# Patient Record
Sex: MALE | Race: WHITE | NOT HISPANIC OR LATINO | Employment: PART TIME | URBAN - METROPOLITAN AREA
[De-identification: names, ages, dates, MRNs, and addresses within clinical notes are randomized per-mention and may not be internally consistent; named-entity substitution may affect disease eponyms.]

---

## 2021-09-15 ENCOUNTER — HOSPITAL ENCOUNTER (EMERGENCY)
Facility: CLINIC | Age: 71
Discharge: HOME | End: 2021-09-15
Attending: EMERGENCY MEDICINE | Admitting: EMERGENCY MEDICINE
Payer: MEDICARE

## 2021-09-15 VITALS
BODY MASS INDEX: 20.09 KG/M2 | TEMPERATURE: 98.2 F | HEIGHT: 66 IN | WEIGHT: 125 LBS | OXYGEN SATURATION: 100 % | SYSTOLIC BLOOD PRESSURE: 117 MMHG | RESPIRATION RATE: 13 BRPM | DIASTOLIC BLOOD PRESSURE: 91 MMHG | HEART RATE: 47 BPM

## 2021-09-15 DIAGNOSIS — R42 LIGHTHEADEDNESS: ICD-10-CM

## 2021-09-15 DIAGNOSIS — R11.0 NAUSEA: ICD-10-CM

## 2021-09-15 DIAGNOSIS — Z86.79 HISTORY OF CORONARY ARTERY DISEASE: ICD-10-CM

## 2021-09-15 LAB
ALBUMIN SERPL-MCNC: 3.6 G/DL (ref 3.4–5)
ALP SERPL-CCNC: 70 U/L (ref 40–150)
ALT SERPL W P-5'-P-CCNC: 29 U/L (ref 0–70)
ANION GAP SERPL CALCULATED.3IONS-SCNC: 6 MMOL/L (ref 3–14)
AST SERPL W P-5'-P-CCNC: 42 U/L (ref 0–45)
ATRIAL RATE - MUSE: 46 BPM
BASOPHILS # BLD AUTO: 0 10E3/UL (ref 0–0.2)
BASOPHILS NFR BLD AUTO: 0 %
BILIRUB DIRECT SERPL-MCNC: 0.3 MG/DL (ref 0–0.2)
BILIRUB SERPL-MCNC: 1.2 MG/DL (ref 0.2–1.3)
BUN SERPL-MCNC: 25 MG/DL (ref 7–30)
CALCIUM SERPL-MCNC: 9 MG/DL (ref 8.5–10.1)
CHLORIDE BLD-SCNC: 104 MMOL/L (ref 94–109)
CO2 SERPL-SCNC: 27 MMOL/L (ref 20–32)
CREAT SERPL-MCNC: 1.15 MG/DL (ref 0.66–1.25)
DIASTOLIC BLOOD PRESSURE - MUSE: NORMAL MMHG
EOSINOPHIL # BLD AUTO: 0 10E3/UL (ref 0–0.7)
EOSINOPHIL NFR BLD AUTO: 1 %
ERYTHROCYTE [DISTWIDTH] IN BLOOD BY AUTOMATED COUNT: 12.2 % (ref 10–15)
GFR SERPL CREATININE-BSD FRML MDRD: 64 ML/MIN/1.73M2
GLUCOSE BLD-MCNC: 133 MG/DL (ref 70–99)
HCT VFR BLD AUTO: 43.4 % (ref 40–53)
HGB BLD-MCNC: 15 G/DL (ref 13.3–17.7)
HOLD SPECIMEN: NORMAL
HOLD SPECIMEN: NORMAL
IMM GRANULOCYTES # BLD: 0 10E3/UL
IMM GRANULOCYTES NFR BLD: 0 %
INTERPRETATION ECG - MUSE: NORMAL
LIPASE SERPL-CCNC: 224 U/L (ref 73–393)
LYMPHOCYTES # BLD AUTO: 0.7 10E3/UL (ref 0.8–5.3)
LYMPHOCYTES NFR BLD AUTO: 12 %
MCH RBC QN AUTO: 32 PG (ref 26.5–33)
MCHC RBC AUTO-ENTMCNC: 34.6 G/DL (ref 31.5–36.5)
MCV RBC AUTO: 93 FL (ref 78–100)
MONOCYTES # BLD AUTO: 0.4 10E3/UL (ref 0–1.3)
MONOCYTES NFR BLD AUTO: 7 %
NEUTROPHILS # BLD AUTO: 4.6 10E3/UL (ref 1.6–8.3)
NEUTROPHILS NFR BLD AUTO: 80 %
NRBC # BLD AUTO: 0 10E3/UL
NRBC BLD AUTO-RTO: 0 /100
P AXIS - MUSE: 51 DEGREES
PLATELET # BLD AUTO: 88 10E3/UL (ref 150–450)
POTASSIUM BLD-SCNC: 4.4 MMOL/L (ref 3.4–5.3)
PR INTERVAL - MUSE: 168 MS
PROT SERPL-MCNC: 6.8 G/DL (ref 6.8–8.8)
QRS DURATION - MUSE: 104 MS
QT - MUSE: 480 MS
QTC - MUSE: 420 MS
R AXIS - MUSE: 18 DEGREES
RBC # BLD AUTO: 4.69 10E6/UL (ref 4.4–5.9)
SODIUM SERPL-SCNC: 137 MMOL/L (ref 133–144)
SYSTOLIC BLOOD PRESSURE - MUSE: NORMAL MMHG
T AXIS - MUSE: 40 DEGREES
TROPONIN I SERPL-MCNC: <0.015 UG/L (ref 0–0.04)
VENTRICULAR RATE- MUSE: 46 BPM
WBC # BLD AUTO: 5.7 10E3/UL (ref 4–11)

## 2021-09-15 PROCEDURE — 82040 ASSAY OF SERUM ALBUMIN: CPT | Performed by: EMERGENCY MEDICINE

## 2021-09-15 PROCEDURE — 84484 ASSAY OF TROPONIN QUANT: CPT | Performed by: EMERGENCY MEDICINE

## 2021-09-15 PROCEDURE — 85025 COMPLETE CBC W/AUTO DIFF WBC: CPT | Performed by: EMERGENCY MEDICINE

## 2021-09-15 PROCEDURE — 80048 BASIC METABOLIC PNL TOTAL CA: CPT | Performed by: EMERGENCY MEDICINE

## 2021-09-15 PROCEDURE — 250N000011 HC RX IP 250 OP 636: Performed by: EMERGENCY MEDICINE

## 2021-09-15 PROCEDURE — 82248 BILIRUBIN DIRECT: CPT | Performed by: EMERGENCY MEDICINE

## 2021-09-15 PROCEDURE — 93005 ELECTROCARDIOGRAM TRACING: CPT

## 2021-09-15 PROCEDURE — 36415 COLL VENOUS BLD VENIPUNCTURE: CPT | Performed by: EMERGENCY MEDICINE

## 2021-09-15 PROCEDURE — 99284 EMERGENCY DEPT VISIT MOD MDM: CPT | Mod: 25

## 2021-09-15 PROCEDURE — 83690 ASSAY OF LIPASE: CPT | Performed by: EMERGENCY MEDICINE

## 2021-09-15 PROCEDURE — 96374 THER/PROPH/DIAG INJ IV PUSH: CPT

## 2021-09-15 RX ORDER — ONDANSETRON 2 MG/ML
4 INJECTION INTRAMUSCULAR; INTRAVENOUS ONCE
Status: COMPLETED | OUTPATIENT
Start: 2021-09-15 | End: 2021-09-15

## 2021-09-15 RX ORDER — ONDANSETRON 4 MG/1
4 TABLET, ORALLY DISINTEGRATING ORAL EVERY 8 HOURS PRN
Qty: 10 TABLET | Refills: 0 | Status: SHIPPED | OUTPATIENT
Start: 2021-09-15

## 2021-09-15 RX ADMIN — ONDANSETRON 4 MG: 2 INJECTION INTRAMUSCULAR; INTRAVENOUS at 11:33

## 2021-09-15 ASSESSMENT — ENCOUNTER SYMPTOMS
DIAPHORESIS: 1
NAUSEA: 1
PALPITATIONS: 0
VOMITING: 0
LIGHT-HEADEDNESS: 1
ABDOMINAL PAIN: 0
SHORTNESS OF BREATH: 0

## 2021-09-15 ASSESSMENT — MIFFLIN-ST. JEOR: SCORE: 1264.75

## 2021-09-15 NOTE — ED PROVIDER NOTES
History   Chief Complaint:  Dizziness and Nausea    The history is provided by the patient.      Sergey Beltran is a 70 year old male with history of coronary artery disease s/p CABG 21 years ago who presents with dizziness and nausea. Sergey developed dizziness - described as lightheadedness - and diaphoresis last night about 20 hours prior to arrival when he awoke to use the restroom. He was able to go back to sleep and woke up 4 hours later with nausea without vomiting and lightheadedness. His diaphoresis had resolved. He tells me the only other time he had these symptoms was before his CABG and this prompted his concern. He denies any chest pain, shortness of breath, palpitations, or abdominal pain.     Sergey had a stress test one year ago that was unremarkable. He just moved from [place], [place] to [place] 5 days ago.    Review of Systems   Constitutional: Positive for diaphoresis (resolved).   Respiratory: Negative for shortness of breath.    Cardiovascular: Negative for chest pain and palpitations.   Gastrointestinal: Positive for nausea. Negative for abdominal pain and vomiting.   Neurological: Positive for light-headedness.   All other systems reviewed and are negative.    Allergies:  The patient has no known allergies.     Medications:  ASA  Atenolol  Ramipril    Past Medical History:    Myocardial infarction  CABG   Coronary artery disease  Colonic polyps  Hypertension  Irritable bowel syndrome    Surgical History:  CABG  Colonoscopy  Trigger finger relase  Esophagogastroduodenoscopy  Vasectomy    Family History:  Mother - Diabetes Mellitus, Heart Disease  Father - Coronary Artery Disease, Myocardial Infarction     Social History:  The patient arrived to the emergency department via EMS. Wife later at bedside.  Currently in the process of moving to the area from [place], [place].  [facility]  Has 4 grandchildren.  Denies tobacco use.     Physical Exam     Patient Vitals for  "the past 24 hrs:   BP Temp Temp src Pulse Resp SpO2 Height Weight   09/15/21 1130 -- -- -- (!) 47 13 100 % -- --   09/15/21 1119 -- -- -- 50 17 100 % -- --   09/15/21 1116 -- 98.2  F (36.8  C) Oral (!) 48 11 100 % -- --   09/15/21 1111 (!) 117/91 -- -- 51 18 100 % 1.676 m (5' 6\") 56.7 kg (125 lb)       Physical Exam  General: Well-developed and well-nourished. Well appearing elderly  man. Cooperative.  Head:  Atraumatic.  Eyes:  Conjunctivae, lids, and sclerae are normal.  Neck:  Supple. Normal range of motion.  CV:  Bradycardic rate and regular rhythm. Normal heart sounds with no murmurs, rubs, or gallops detected.  Resp:  No respiratory distress. Clear to auscultation bilaterally without decreased breath sounds, wheezing, rales, or rhonchi.  GI:  Soft. Non-distended. Non-tender.    MS:  Normal ROM. No bilateral lower extremity edema.  Skin:  Warm. Non-diaphoretic. No pallor.  Neuro:  Awake. A&Ox3. Normal strength.  Psych: Normal mood and affect. Normal speech.  Vitals reviewed.    Emergency Department Course   EKG  Indication: Dizziness, Nausea, Diaphoresis   Time: 1112  Rate 46 bpm. IN interval 168. QRS duration 104. QT/QTc 480/420.   Sinus bradycardia  Incomplete right bundle branch block  Anterior infarct, age undetermined  Abnormal ECG   No acute ST changes.  No prior for comparison.    Laboratory:   CBC: WBC 5.7, HGB 15.0, PLT 88 (L)   BMP: Glucose 133 (H) o/w WNL (Creatinine 1.15)   Hepatic Panel: Bilirubin Direct 0.3 (H), o/w WNL  Troponin (Collected 1120): <0.015  Lipase: 224    Emergency Department Course:    Reviewed:  I reviewed nursing notes and vitals.  I reviewed Care Everywhere when it was available.    Assessments:  1118 I obtained history and examined the patient as noted above.   1306 I rechecked the patient and explained findings. He has eaten and feels improved. He is comfortable with discharge.     Interventions:  1133 Zofran 4 mg IV    Disposition:  The patient was discharged home. " "    Impression & Plan   Medical Decision Making:  Sergey is a 70-year-old man with a history of coronary artery disease requiring CABG 21 years ago who developed nausea without vomiting, lightheadedness, and clammy diaphoresis 20 hours prior to arrival.  He was able to fall asleep after the symptoms but when he awoke about 16 hours prior to arrival he still felt nauseous and lightheaded.  He tells me the only other time he had these symptoms was \"before my heart attack\" which prompted his visit.  He denies all other concerns or complaints.  He appears well on exam.  His EKG is reassuring with sinus bradycardia.  There are no ischemic changes.  Initial troponin is undetectable.  Because this was obtained 20 hours after onset of symptoms, this essentially rules out ACS.  He is new to the area and does not have any cardiologist here.  I was able to obtain prior records which show reassuring stress test in October 2020.  His work-up here is otherwise unremarkable aside from thrombocytopenia to 88.  It appears his platelets were 119 in 2018 so I suspect this is chronic.  He was treated with Zofran and required no further interventions.  On repeat assessment he felt improved.  He has been able to tolerate PO and ambulate without difficulty.  At this point it is uncertain what caused his symptoms but given his reassuring work-up he is appropriate for discharge.  He is due for another stress test and I have placed an order for this.  I have also placed referrals for both primary care and cardiology given he is new to the area and will require ER follow-up to reassess his symptoms.  I will provide him Zofran as needed for nausea and vomiting at home and encouraged a bland diet.  He does understand he needs to return to the emergency department if he has recurrence/worsening of symptoms or if he develops any new concerns.  All questions answered.    Diagnosis:    ICD-10-CM    1. Nausea  R11.0    2. Lightheadedness  R42    3. " History of coronary artery disease  Z86.79      Scribe Disclosure:  I, Sophie Christy, am serving as a scribe at 11:17 AM on 9/15/2021 to document services personally performed by Brigida Tatum MD based on my observations and the provider's statements to me.     Brigida Tatum MD  09/17/21 1100

## 2021-09-15 NOTE — DISCHARGE INSTRUCTIONS
Zofran as needed for nausea or vomiting.  [place] diet today.  I placed an order for an outpatient stress test as well as a referral to both cardiology and primary care.  You should be getting calls to help arrange these.  You need to return immediately if you have worsening of symptoms or you develop any new concerns including, but not limited to, chest pain, shortness of breath, or palpitations.

## 2021-09-15 NOTE — ED TRIAGE NOTES
Pt BIBA, pt just moved to the area from [place], [place]. Patient reports last night he had some chills, sweats and this morning he developed nausea feeling lightheaded and weak. Heart rate for EMS 48, pt normally in the 50s. Hx of MI and CABG. Patient does not take blood thinner. Pt took 2 tabs of 325mg aspirin. Pt received 4mg zofran IV per EMS. Blood glucose for EMS was 137. Denies chest pain or SOB.

## 2024-10-26 ENCOUNTER — APPOINTMENT (OUTPATIENT)
Dept: GENERAL RADIOLOGY | Facility: CLINIC | Age: 74
End: 2024-10-26
Attending: EMERGENCY MEDICINE
Payer: MEDICARE

## 2024-10-26 ENCOUNTER — HOSPITAL ENCOUNTER (EMERGENCY)
Facility: CLINIC | Age: 74
Discharge: HOME | End: 2024-10-26
Attending: EMERGENCY MEDICINE | Admitting: EMERGENCY MEDICINE
Payer: MEDICARE

## 2024-10-26 VITALS
HEART RATE: 56 BPM | BODY MASS INDEX: 21.79 KG/M2 | WEIGHT: 135 LBS | RESPIRATION RATE: 16 BRPM | TEMPERATURE: 97.9 F | DIASTOLIC BLOOD PRESSURE: 47 MMHG | SYSTOLIC BLOOD PRESSURE: 168 MMHG | OXYGEN SATURATION: 98 %

## 2024-10-26 DIAGNOSIS — R10.13 EPIGASTRIC PAIN: ICD-10-CM

## 2024-10-26 LAB
ALBUMIN SERPL BCG-MCNC: 4.1 G/DL (ref 3.5–5.2)
ALP SERPL-CCNC: 78 U/L (ref 40–150)
ALT SERPL W P-5'-P-CCNC: 18 U/L (ref 0–70)
ANION GAP SERPL CALCULATED.3IONS-SCNC: 5 MMOL/L (ref 7–15)
AST SERPL W P-5'-P-CCNC: 36 U/L (ref 0–45)
ATRIAL RATE - MUSE: 54 BPM
BASOPHILS # BLD AUTO: 0 10E3/UL (ref 0–0.2)
BASOPHILS NFR BLD AUTO: 0 %
BILIRUB SERPL-MCNC: 0.7 MG/DL
BUN SERPL-MCNC: 25.3 MG/DL (ref 8–23)
CALCIUM SERPL-MCNC: 9.7 MG/DL (ref 8.8–10.4)
CHLORIDE SERPL-SCNC: 101 MMOL/L (ref 98–107)
CREAT SERPL-MCNC: 1.4 MG/DL (ref 0.67–1.17)
DIASTOLIC BLOOD PRESSURE - MUSE: NORMAL MMHG
EGFRCR SERPLBLD CKD-EPI 2021: 53 ML/MIN/1.73M2
EOSINOPHIL # BLD AUTO: 0.1 10E3/UL (ref 0–0.7)
EOSINOPHIL NFR BLD AUTO: 1 %
ERYTHROCYTE [DISTWIDTH] IN BLOOD BY AUTOMATED COUNT: 12.4 % (ref 10–15)
GLUCOSE SERPL-MCNC: 105 MG/DL (ref 70–99)
HCO3 SERPL-SCNC: 33 MMOL/L (ref 22–29)
HCT VFR BLD AUTO: 46.5 % (ref 40–53)
HGB BLD-MCNC: 15.8 G/DL (ref 13.3–17.7)
HOLD SPECIMEN: NORMAL
IMM GRANULOCYTES # BLD: 0 10E3/UL
IMM GRANULOCYTES NFR BLD: 0 %
INTERPRETATION ECG - MUSE: NORMAL
LYMPHOCYTES # BLD AUTO: 0.8 10E3/UL (ref 0.8–5.3)
LYMPHOCYTES NFR BLD AUTO: 18 %
MCH RBC QN AUTO: 31.8 PG (ref 26.5–33)
MCHC RBC AUTO-ENTMCNC: 34 G/DL (ref 31.5–36.5)
MCV RBC AUTO: 94 FL (ref 78–100)
MONOCYTES # BLD AUTO: 0.4 10E3/UL (ref 0–1.3)
MONOCYTES NFR BLD AUTO: 9 %
NEUTROPHILS # BLD AUTO: 3 10E3/UL (ref 1.6–8.3)
NEUTROPHILS NFR BLD AUTO: 71 %
NRBC # BLD AUTO: 0 10E3/UL
NRBC BLD AUTO-RTO: 0 /100
P AXIS - MUSE: 34 DEGREES
PLATELET # BLD AUTO: 85 10E3/UL (ref 150–450)
POTASSIUM SERPL-SCNC: 4.6 MMOL/L (ref 3.4–5.3)
PR INTERVAL - MUSE: 138 MS
PROT SERPL-MCNC: 6.7 G/DL (ref 6.4–8.3)
QRS DURATION - MUSE: 94 MS
QT - MUSE: 432 MS
QTC - MUSE: 409 MS
R AXIS - MUSE: -24 DEGREES
RBC # BLD AUTO: 4.97 10E6/UL (ref 4.4–5.9)
SODIUM SERPL-SCNC: 139 MMOL/L (ref 135–145)
SYSTOLIC BLOOD PRESSURE - MUSE: NORMAL MMHG
T AXIS - MUSE: 13 DEGREES
TROPONIN T SERPL HS-MCNC: <6 NG/L
TROPONIN T SERPL HS-MCNC: <6 NG/L
VENTRICULAR RATE- MUSE: 54 BPM
WBC # BLD AUTO: 4.2 10E3/UL (ref 4–11)

## 2024-10-26 PROCEDURE — 36415 COLL VENOUS BLD VENIPUNCTURE: CPT | Performed by: EMERGENCY MEDICINE

## 2024-10-26 PROCEDURE — 80053 COMPREHEN METABOLIC PANEL: CPT | Performed by: EMERGENCY MEDICINE

## 2024-10-26 PROCEDURE — 85025 COMPLETE CBC W/AUTO DIFF WBC: CPT | Performed by: EMERGENCY MEDICINE

## 2024-10-26 PROCEDURE — 93005 ELECTROCARDIOGRAM TRACING: CPT

## 2024-10-26 PROCEDURE — 99285 EMERGENCY DEPT VISIT HI MDM: CPT | Mod: 25

## 2024-10-26 PROCEDURE — 71046 X-RAY EXAM CHEST 2 VIEWS: CPT

## 2024-10-26 PROCEDURE — 84484 ASSAY OF TROPONIN QUANT: CPT | Performed by: EMERGENCY MEDICINE

## 2024-10-26 ASSESSMENT — ACTIVITIES OF DAILY LIVING (ADL)
ADLS_ACUITY_SCORE: 0

## 2024-10-26 ASSESSMENT — COLUMBIA-SUICIDE SEVERITY RATING SCALE - C-SSRS
6. HAVE YOU EVER DONE ANYTHING, STARTED TO DO ANYTHING, OR PREPARED TO DO ANYTHING TO END YOUR LIFE?: NO
1. IN THE PAST MONTH, HAVE YOU WISHED YOU WERE DEAD OR WISHED YOU COULD GO TO SLEEP AND NOT WAKE UP?: NO
2. HAVE YOU ACTUALLY HAD ANY THOUGHTS OF KILLING YOURSELF IN THE PAST MONTH?: NO

## 2024-10-26 NOTE — ED TRIAGE NOTES
Pt reports indigestion x couple days. Worsening substernal burning pain that started today at 2200 while sitting up in bed, lasted a couple hours. Pt called 911 - took aspirin and had EKG done by medics. Pt took pepcid with no relief.

## 2024-10-26 NOTE — ED NOTES
Bed: ED13  Expected date: 10/26/24  Expected time: 1:13 AM  Means of arrival:   Comments:  Triage

## 2024-10-26 NOTE — ED PROVIDER NOTES
Emergency Department Note      History of Present Illness     Chief Complaint   Chest Pain      HPI   Sergey Beltran is a 73 year old male with history of hyperlipidemia and MI who presents to the ED with his wife for evaluation of chest pain. The patient reports that he has been dealing with digestive issues for the past 4-5 days. Tonight, the symptoms intensified and his chest pain became significant around 10 PM tonight. Patient took two Aspirin 325 mg tonight. He notes that he had a myocardial infarction about 24 years ago. He denies hypertension or hyperlipidemia. Patient denies shortness of breath, leg swelling, or leg pain.    Independent Historian   None    Review of External Notes   Reviewed cardiology clinic notes from yesterday    Past Medical History     Medical History and Problem List   Hypercholesteremia  MI  CAD  Hyperlipidemia  GERD  Hiatal hernia    Medications   Aspirin 325 mg  Tenormin  Lipitor  Altace  Ambien  Bentyl  Viagra    Surgical History   Coronary artery bypass graft  Colonoscopy  EGD  Vasectomy    Physical Exam     Patient Vitals for the past 24 hrs:   BP Temp Pulse Resp SpO2 Weight   10/26/24 0135 (!) 168/47 97.9  F (36.6  C) 56 16 98 % 61.2 kg (135 lb)     Physical Exam  General: Resting on the gurney, appears comfortable  Head:  The scalp, face, and head appear normal  Mouth/Throat: Mucus membranes are moist  CV:  Regular rate    Normal S1 and S2  No pathological murmur   Resp:  Breath sounds clear and equal bilaterally    Non-labored, no retractions or accessory muscle use    No coarseness    No wheezing   GI:  Abdomen is soft, no rigidity    No tenderness to palpation  MS:  Normal motor assessment of all extremities.    Good capillary refill noted.  Skin:  No rash or lesions noted.  Neuro:   Speech is normal and fluent. No apparent deficit.  Psych: Awake. Alert.  Normal affect.      Appropriate interactions.     Diagnostics     Lab Results   Labs Ordered and Resulted from Time of  ED Arrival to Time of ED Departure   COMPREHENSIVE METABOLIC PANEL - Abnormal       Result Value    Sodium 139      Potassium 4.6      Carbon Dioxide (CO2) 33 (*)     Anion Gap 5 (*)     Urea Nitrogen 25.3 (*)     Creatinine 1.40 (*)     GFR Estimate 53 (*)     Calcium 9.7      Chloride 101      Glucose 105 (*)     Alkaline Phosphatase 78      AST 36      ALT 18      Protein Total 6.7      Albumin 4.1      Bilirubin Total 0.7     CBC WITH PLATELETS AND DIFFERENTIAL - Abnormal    WBC Count 4.2      RBC Count 4.97      Hemoglobin 15.8      Hematocrit 46.5      MCV 94      MCH 31.8      MCHC 34.0      RDW 12.4      Platelet Count 85 (*)     % Neutrophils 71      % Lymphocytes 18      % Monocytes 9      % Eosinophils 1      % Basophils 0      % Immature Granulocytes 0      NRBCs per 100 WBC 0      Absolute Neutrophils 3.0      Absolute Lymphocytes 0.8      Absolute Monocytes 0.4      Absolute Eosinophils 0.1      Absolute Basophils 0.0      Absolute Immature Granulocytes 0.0      Absolute NRBCs 0.0     TROPONIN T, HIGH SENSITIVITY - Normal    Troponin T, High Sensitivity <6     TROPONIN T, HIGH SENSITIVITY - Normal    Troponin T, High Sensitivity <6         Imaging   Chest XR,  PA & LAT   Final Result   IMPRESSION: PA and lateral views of the chest were obtained. Postsurgical changes of cardiac surgery with median sternotomy wires and surgical clips. Cardiomediastinal silhouette is within normal limits. No suspicious focal pulmonary opacities. No    significant pleural effusion or pneumothorax.          EKG   ECG taken at 01:33, ECG read at 01:46  Sinus bradycardia  Incomplete right BBB  Anteroseptal infarct, age undetermined   Rate 54 bpm. TN interval 138 ms. QRS duration 94 ms. QT/QTc 432/409 ms. P-R-T axes 34 -24 13.    Independent Interpretation   CXR: No pneumothorax.    ED Course      ED Course   ED Course as of 10/26/24 0201   Sat Oct 26, 2024   0152 I obtained history and examined the patient as noted above.           Medical Decision Making / Diagnosis       MDM   The patient was see in the ED today with complaints of chest pain today. We considered the following etiologies for the patient's chest pain today:     Aortic dissection: The patient has no pulse deficit on examination. There are no complaints of neurologic impairment. There is no murmur characteristic of aortic regurgitation. There is no widening of the mediastinum on chest x-ray and felt the clinical history was otherwise not suggestive of aortic dissection or aneurysm thus I do not feel the patient needs CT scan of the chest at this time.     Boerhaave syndrome: There is no historical data of severe vomiting, hematemesis and no significant abdominal tenderness on exam. There is no mediastinal air on chest x-ray.     Pericardial effusion/tamponade: There are no muffling of the heart sounds or significant hypotension in the ED. No electrical alternans on EKG.     Pulmonary embolus: No shortness of breath. No hypoxia.  No tachycardia.    Pneumonia: CXR is clear.  O2 sats are within normal limits.  Lung exam is clear and history is otherwise unconcerning for pneumonia.    Coronary artery disease: Patient's historical features are less suggestive of ACS. Initial EKG reveals no ST segment elevation that is diagnostic for acute MI and there are no other ischemic changes noted. Initial troponin in the ED is negative.  Given his cardiac history he had actually recently spoken to his cardiologist.  He is scheduled to have a CT coronary angiogram on Tuesday.  He will return with any new or worsening symptoms, and will follow-up with imaging as planned per his cardiologist.    Diagnosis:   1. Chest pain    Plan:  F/U with PMD following stress test and return to ED immediately with worsening symptoms.      Disposition   The patient was discharged.     Diagnosis     ICD-10-CM    1. Epigastric pain  R10.13              Scribe Disclosure:  Rob BADLERAS, am serving as a  scribe at 1:59 AM on 10/26/2024 to document services personally performed by Elsa Zavala MD based on my observations and the provider's statements to me.        Elsa Zavala MD  10/26/24 0652